# Patient Record
Sex: MALE | Employment: OTHER
[De-identification: names, ages, dates, MRNs, and addresses within clinical notes are randomized per-mention and may not be internally consistent; named-entity substitution may affect disease eponyms.]

---

## 2017-03-20 NOTE — PATIENT DISCUSSION
CATARACT, OU - VISUALLY SIGNIFICANT. SCHEDULE SX OS THEN LATER IN OD IF VISUAL SYMPTOMS PERSIST.  GLS RX GIVEN TO FILL IF DESIRES IN THE EVENT PT DOES NOT PROCEED W/ SX.

## 2017-03-20 NOTE — PATIENT DISCUSSION
Dry Age Related Macular Degeneration (AMD) Counseling: Age Related Macular Degeneration is a deterioration or breakdown of the eye's macula, the part of the retina that is responsible for central vision. Symptoms include, but are not limited to, hazy vision, difficulty seeing when going from bright light to low light, and a blank or blurry spot in your central vision. I have explained to the patient that successful management is dependent on patient compliance with treatment as prescribed and/or regular follow-up to monitor for possible progression.

## 2017-03-20 NOTE — PATIENT DISCUSSION
Surgery  Counseling: I have discussed the option of glasses versus cataract surgery versus following. It was explained that when vision no longer meets the patient&rsquo;s visual needs and a new prescription for glasses is not likely to improve the patient&rsquo;s visual symptoms, the option of cataract surgery is a reasonable next step. It was explained that there is no guarantee that removing the cataract will improve their visual symptoms, however; it is believed that the cataract is contributing to the patient's visual impairment and surgery may significantly improve both the visual and functional status of the patient. The risks, benefits and alternatives of surgery were discussed with the patient. After this discussion, the patient desires to proceed with cataract surgery with implantation of an intraocular lens to improve vision for [facility] [address] [employer].

## 2017-03-20 NOTE — PATIENT DISCUSSION
Posterior Vitreous Detachment (PVD) Counseling: A posterior vitreous detachment (PVD) is a condition of the eye in which the vitreous membrane separates from the retina. I have discussed the possibility of a retinal tear or detachment. The signs/symptoms of a retinal tear/detachment were reviewed including, but not limited to, A sudden increase in size and number of floaters, indicating a retinal tear may be occurring, a sudden appearance of flashes (which could be the first stage of a retinal tear or detachment), having a shadow appear in the periphery of your field of vision, seeing a gray curtain moving across your field of vision, and/or a sudden decrease in your vision. The patient was instructed to contact us immediately if they noticed any of these signs or symptoms.

## 2017-03-20 NOTE — PATIENT DISCUSSION
Long Axial Length/High Myopia/Lattice Degeneration Counseling: Long Axial Length, High Myopia, and Lattice Degeneration cause nearsightedness (myopia) and increase the risk of retinal detachment during cataract surgery. It is recommended that the patient have a dilated fundus exam with a retinal specialist for clearance prior to cataract surgery which may require additional treatment. It has been explained that the vision after cataract surgery may still be limited as a result of these things.

## 2017-03-20 NOTE — PATIENT DISCUSSION
It was explained to the patient that the vision may still be limited after cataract surgery as a result of the macular degeneration, however; it is believed that the cataract is contributing to the patient's visual impairment and surgery may significantly improve both the visual and functional status of the patient.

## 2017-03-20 NOTE — PATIENT DISCUSSION
Glaucoma Suspect/iStent Counseling:  A glaucoma suspect is a person with one or more risk factors that may lead to glaucoma. I have explained to the patient that glaucoma potentially causes loss of peripheral vision due to damage to the optic nerve that is irreversible. I have discussed the option of an iStent micro-invasive glaucoma surgery device at the time of cataract surgery should further evaluation indicate glaucoma is present. Patient understands and will decide on iStent insertion pending visual field results.

## 2017-03-20 NOTE — PATIENT DISCUSSION
GLAUCOMA SUSPECT, OU &ndash; HX OF BORDERLINE IOP. IOP WNL, OU TODAY . OCT NEXT VISIT TO CHECK FOR RNFL THINNING. VISUAL FIELD NEXT VISIT TO CHECK FOR VISUAL FIELD LOSS.

## 2017-03-27 NOTE — PATIENT DISCUSSION
"AMD (Dry) : I have instructed the patient to take an AREDS 2 vitamin mixture to minimize the risk of developing ""wet"" macular degeneration. The importance of daily monitoring with [place] grid was emphasized. New persistent blurring or distortion of vision should be evaluated. "

## 2017-03-27 NOTE — PATIENT DISCUSSION
RETINA IS ATTACHED OU: PVD OU. NO HOLES OR TEARS SEEN ON DILATED EXAM TODAY.  RETINAL DETACHMENT SIGNS AND SYMPTOMS REVIEWED

## 2017-03-27 NOTE — PATIENT DISCUSSION
STABLE NON-EXUDATIVE AMD OU:  CONTINUE AREDS 2 VITAMINS / AMSLER GRID QD/ UV PROTECTION. SMOKING AVOIDANCE REVIEWED. RETURN FOR FOLLOW-UP AS SCHEDULED.

## 2017-03-29 NOTE — PATIENT DISCUSSION
iStent Counseling s/p Glaucoma Screening: The option of an iStent micro-invasive glaucoma surgery device at the time of cataract surgery was previously discussed with the patient. I have explained to the patient at length that their visual field testing confirms glaucomatous changes are present and the iStent is indicated for use at the time of cataract surgery.

## 2017-04-04 NOTE — PATIENT DISCUSSION
REFRACTIVE ERROR- PT ELECTS TO CORRECT HIS ASTIGMATISM AND TARGET DVA OU. HE IS AWARE HE WILL NEED GLS FOR NVA.

## 2017-04-13 NOTE — PATIENT DISCUSSION
Post-Op Instructions: The patient was instructed in the proper use of post-operative eye drops: pred-gati-nepaf in the surgical eye 3 times per day for 3 weeks, then discontinue. Recommended to the patient to continue PRN vitamins for 90 days or until their 90 days supply is gone. Call back instructions, retinal detachment and endophthalmitis precautions given.

## 2017-04-13 NOTE — PATIENT DISCUSSION
Surgery  Counseling: I have discussed the option of glasses versus cataract surgery versus following . It was explained that when vision no longer meets the patient&rsquo;s visual needs and a new prescription for glasses is not likely to improve all of the patient&rsquo;s visual symptoms, the option of cataract surgery is a reasonable next step. It was explained that there is no guarantee that removing the cataract will improve their visual symptoms, however; it is believed that the cataract is contributing to the patient's visual impairment and surgery may significantly improve both the visual and functional status of the patient. The risks, benefits and alternatives of surgery were discussed with the patient. After this discussion, the patient desires to proceed with cataract surgery with implantation of an intraocular lens to improve vision for [facility] [place].

## 2017-04-13 NOTE — PATIENT DISCUSSION
S/P PCIOL, OS- STABLE, DOING WELL. ADVISED PT THAT THE DIM VA IS DUE TO A DROP USED DURING SX AND THE VA SHOULD IMPROVE AS THE PUPIL RETURNS TO ITS NATURAL STATE. OK TO PROCEED WITH FELLOW EYE SURGERY.

## 2017-04-13 NOTE — PATIENT DISCUSSION
BURP PERFORMED IN THE OFFICE TODAY TO RELIEVE ELEVATED IOP. INSTILLED 1 DROP OPHTHETIC AND 1 DROP ANTIBIOTIC (BESIVANCE) PRIOR TO BURP. IOP S/P BURP ACCEPTABLE.

## 2017-05-26 NOTE — PATIENT DISCUSSION
S/P PC IOL, OU-  DOING WELL. CONTINUE DROPS AS DIRECTED. RX OTC READERS PRN FOR NVA. ADVISED PT FBS IS LIKELY DUE TO HEALING AROUND THE INCISION AND OK TO CONT OTC ATS PRN. OFFERED CONSULT W/ DR. Tony Nyhan TO EVAL FLOATERS, PT DECLINES. FOLLOW.

## 2018-11-30 ENCOUNTER — CONSULTATION (OUTPATIENT)
Age: 83
End: 2018-11-30

## 2018-11-30 NOTE — PATIENT DISCUSSION
Discussed findings with patient.  Greater than 50% of encounter was spent in dialogue with patient.  I offered removal of extruded scleral buckle in the right eye.  Patient understands risks,benefits and procedure.  Desires to proceed with surgery.  This will be scheduled in the near future. DISCHARGE

## 2018-12-06 ASSESSMENT — VISUAL ACUITY
OD_CC: 20/30-2
OS_CC: 20/25-1

## 2018-12-06 ASSESSMENT — TONOMETRY
OD_IOP_MMHG: 13
OS_IOP_MMHG: 14

## 2019-11-15 NOTE — PATIENT DISCUSSION
Fwd to Dr. Faustin  Please advise on refill request    pantoprazole (PROTONIX) 40 MG tablet   10/25/2019     Sig - Route: Take 1 tablet by mouth daily. - Oral    Class: Historical Med         Medications:

## 2020-08-20 NOTE — PATIENT DISCUSSION
FLUORESCEIN ANGIOGRAPHY RESULTS: OD -- drusen confluent staining; no CNV complex; no leakage. OS -- juxtafoveal spots of RPE dropout with staining window defects and pigment blocking; no CNV complex; no leakage.

## 2020-08-20 NOTE — PATIENT DISCUSSION
Continue: PreserVision AREDS 2 (vit c,s-pw-lnqmq-lutein-zeaxan): capsule: 868-165-51-0 mg-unit-mg-mg 1 capsule twice a day by mouth

## 2020-08-20 NOTE — PATIENT DISCUSSION
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

## 2020-08-20 NOTE — PATIENT DISCUSSION
COLOR FUNDUS PHOTO RESULTS: OD -- disc sharp; drusen large confluent; RPE clumping. OS -- disc sharp; drusen large confluent; RPE clumping; spots of RPE atrophy nasal and temporal to foveal center.

## 2020-08-20 NOTE — PATIENT DISCUSSION
Continue: PreserVision AREDS 2 (vit c,z-dw-lchwz-lutein-zeaxan): capsule: 242-018-64-0 mg-unit-mg-mg 1 capsule twice a day by mouth

## 2021-01-07 NOTE — PATIENT DISCUSSION
RETINA IS ATTACHED OU: PVD OU; NEW PERIPHERAL RETINAL TEARS OD; NO HOLES OR TEARS SEEN ON DILATED EXAM OS.  RETINAL DETACHMENT SIGNS AND SYMPTOMS REVIEWED

## 2021-01-07 NOTE — PATIENT DISCUSSION
Post-laser (FE) Counseling:  Treatment of retinal breaks with laser greatly reduces but does not completely eliminate the risk of retinal detachment. Keep any scheduled appointments for re-evaluation of the retina, and report any increase in flashing lights or floaters.

## 2021-01-07 NOTE — PATIENT DISCUSSION
Continue: PreserVision AREDS 2 (vit c,k-ks-isufj-lutein-zeaxan): capsule: 976-214-71-4 mg-unit-mg-mg 1 capsule twice a day by mouth

## 2021-01-21 NOTE — PATIENT DISCUSSION
Continue: PreserVision AREDS 2 (vit c,n-ah-csfid-lutein-zeaxan): capsule: 656-869-92-1 mg-unit-mg-mg 1 capsule twice a day by mouth

## 2021-02-04 NOTE — PATIENT DISCUSSION
Continue: PreserVision AREDS 2 (vit c,o-ke-caqdw-lutein-zeaxan): capsule: 152-415-76-3 mg-unit-mg-mg 1 capsule twice a day by mouth

## 2021-04-10 NOTE — PATIENT DISCUSSION
Continue: PreserVision AREDS 2 (vit c,b-re-pajpn-lutein-zeaxan): capsule: 983-417-61-4 mg-unit-mg-mg 1 capsule twice a day by mouth

## 2021-04-10 NOTE — PATIENT DISCUSSION
POST-OP MONTH 2 EXAM S/P LASER RETINOPEXY OS : Doing well today. Retina attached. IOP within acceptable limits. Finish eyedrops in the surgical eye as instructed. Retinal detachment and endophthalmitis precautions reviewed. Instructed to call immediately for worsening vision, eye pain, or eye discharge.

## 2021-06-02 NOTE — PATIENT DISCUSSION
NON-EXUDATIVE AMD, OU:  FOCAL SPOTS OF RPE ATROPHY OU; CONTINUE AREDS 2 VITAMINS / [place] HOME OR AMSLER GRID QD/ UV PROTECTION. SMOKING AVOIDANCE REVIEWED. RETURN FOR FOLLOW-UP AS SCHEDULED.

## 2021-06-02 NOTE — PATIENT DISCUSSION
Continue: PreserVision AREDS 2 (vit c,j-vt-psrca-lutein-zeaxan): capsule: 618-135-56-0 mg-unit-mg-mg 1 capsule twice a day by mouth

## 2021-08-02 NOTE — PATIENT DISCUSSION
STABLE NON-EXUDATIVE AMD, OU:  CONTINUE AREDS 2 VITAMINS / [place] HOME OR AMSLER GRID QD/ UV PROTECTION. SMOKING AVOIDANCE REVIEWED. RETURN FOR FOLLOW-UP AS SCHEDULED.

## 2021-08-02 NOTE — PATIENT DISCUSSION
Continue: PreserVision AREDS 2 (vit c,v-nc-symop-lutein-zeaxan): capsule: 452-661-03-3 mg-unit-mg-mg 1 capsule twice a day by mouth